# Patient Record
Sex: FEMALE | Race: WHITE | NOT HISPANIC OR LATINO | Employment: PART TIME
[De-identification: names, ages, dates, MRNs, and addresses within clinical notes are randomized per-mention and may not be internally consistent; named-entity substitution may affect disease eponyms.]

---

## 2017-01-05 ENCOUNTER — RECORDS - HEALTHEAST (OUTPATIENT)
Dept: ADMINISTRATIVE | Facility: OTHER | Age: 67
End: 2017-01-05

## 2017-03-23 ENCOUNTER — COMMUNICATION - HEALTHEAST (OUTPATIENT)
Dept: INTERNAL MEDICINE | Facility: CLINIC | Age: 67
End: 2017-03-23

## 2017-03-23 DIAGNOSIS — G47.00 INSOMNIA: ICD-10-CM

## 2017-04-06 ENCOUNTER — COMMUNICATION - HEALTHEAST (OUTPATIENT)
Dept: INTERNAL MEDICINE | Facility: CLINIC | Age: 67
End: 2017-04-06

## 2017-06-05 ENCOUNTER — RECORDS - HEALTHEAST (OUTPATIENT)
Dept: ADMINISTRATIVE | Facility: OTHER | Age: 67
End: 2017-06-05

## 2017-06-21 ENCOUNTER — OFFICE VISIT - HEALTHEAST (OUTPATIENT)
Dept: INTERNAL MEDICINE | Facility: CLINIC | Age: 67
End: 2017-06-21

## 2017-06-21 DIAGNOSIS — G47.00 INSOMNIA: ICD-10-CM

## 2017-06-21 DIAGNOSIS — H26.9 CATARACT: ICD-10-CM

## 2017-06-21 DIAGNOSIS — Z01.818 PREOP EXAMINATION: ICD-10-CM

## 2017-06-21 DIAGNOSIS — F90.9 ADHD (ATTENTION DEFICIT HYPERACTIVITY DISORDER): ICD-10-CM

## 2017-06-21 ASSESSMENT — MIFFLIN-ST. JEOR: SCORE: 1135.7

## 2017-06-22 ENCOUNTER — RECORDS - HEALTHEAST (OUTPATIENT)
Dept: ADMINISTRATIVE | Facility: OTHER | Age: 67
End: 2017-06-22

## 2017-06-22 ENCOUNTER — COMMUNICATION - HEALTHEAST (OUTPATIENT)
Dept: INTERNAL MEDICINE | Facility: CLINIC | Age: 67
End: 2017-06-22

## 2017-09-18 ENCOUNTER — COMMUNICATION - HEALTHEAST (OUTPATIENT)
Dept: SCHEDULING | Facility: CLINIC | Age: 67
End: 2017-09-18

## 2017-09-18 DIAGNOSIS — G47.00 INSOMNIA: ICD-10-CM

## 2017-10-06 ENCOUNTER — RECORDS - HEALTHEAST (OUTPATIENT)
Dept: ADMINISTRATIVE | Facility: OTHER | Age: 67
End: 2017-10-06

## 2017-11-30 ENCOUNTER — OFFICE VISIT - HEALTHEAST (OUTPATIENT)
Dept: INTERNAL MEDICINE | Facility: CLINIC | Age: 67
End: 2017-11-30

## 2017-11-30 DIAGNOSIS — E78.00 ELEVATED CHOLESTEROL: ICD-10-CM

## 2017-11-30 DIAGNOSIS — G47.00 INSOMNIA: ICD-10-CM

## 2017-11-30 DIAGNOSIS — F90.9 ADHD (ATTENTION DEFICIT HYPERACTIVITY DISORDER): ICD-10-CM

## 2017-11-30 LAB
CHOLEST SERPL-MCNC: 242 MG/DL
FASTING STATUS PATIENT QL REPORTED: YES
HDLC SERPL-MCNC: 74 MG/DL
LDLC SERPL CALC-MCNC: 158 MG/DL
TRIGL SERPL-MCNC: 51 MG/DL

## 2017-11-30 ASSESSMENT — MIFFLIN-ST. JEOR: SCORE: 1107.36

## 2017-12-19 ENCOUNTER — RECORDS - HEALTHEAST (OUTPATIENT)
Dept: ADMINISTRATIVE | Facility: OTHER | Age: 67
End: 2017-12-19

## 2017-12-20 ENCOUNTER — COMMUNICATION - HEALTHEAST (OUTPATIENT)
Dept: INTERNAL MEDICINE | Facility: CLINIC | Age: 67
End: 2017-12-20

## 2018-03-13 ENCOUNTER — COMMUNICATION - HEALTHEAST (OUTPATIENT)
Dept: INTERNAL MEDICINE | Facility: CLINIC | Age: 68
End: 2018-03-13

## 2018-03-13 DIAGNOSIS — G47.00 INSOMNIA: ICD-10-CM

## 2018-03-21 ENCOUNTER — COMMUNICATION - HEALTHEAST (OUTPATIENT)
Dept: INTERNAL MEDICINE | Facility: CLINIC | Age: 68
End: 2018-03-21

## 2018-03-21 DIAGNOSIS — F90.9 ADHD (ATTENTION DEFICIT HYPERACTIVITY DISORDER): ICD-10-CM

## 2018-03-28 ENCOUNTER — RECORDS - HEALTHEAST (OUTPATIENT)
Dept: ADMINISTRATIVE | Facility: OTHER | Age: 68
End: 2018-03-28

## 2018-05-22 ENCOUNTER — OFFICE VISIT - HEALTHEAST (OUTPATIENT)
Dept: INTERNAL MEDICINE | Facility: CLINIC | Age: 68
End: 2018-05-22

## 2018-05-22 DIAGNOSIS — N64.4 BREAST PAIN, RIGHT: ICD-10-CM

## 2018-05-29 ENCOUNTER — HOSPITAL ENCOUNTER (OUTPATIENT)
Dept: ULTRASOUND IMAGING | Facility: CLINIC | Age: 68
Discharge: HOME | End: 2018-05-29
Attending: INTERNAL MEDICINE

## 2018-05-29 ENCOUNTER — HOSPITAL ENCOUNTER (OUTPATIENT)
Dept: MAMMOGRAPHY | Facility: CLINIC | Age: 68
Discharge: HOME | End: 2018-05-29
Attending: INTERNAL MEDICINE

## 2018-05-29 DIAGNOSIS — N64.4 BREAST PAIN, RIGHT: ICD-10-CM

## 2018-06-06 ENCOUNTER — RECORDS - HEALTHEAST (OUTPATIENT)
Dept: ADMINISTRATIVE | Facility: OTHER | Age: 68
End: 2018-06-06

## 2018-06-14 ENCOUNTER — COMMUNICATION - HEALTHEAST (OUTPATIENT)
Dept: INTERNAL MEDICINE | Facility: CLINIC | Age: 68
End: 2018-06-14

## 2018-06-14 ENCOUNTER — OFFICE VISIT - HEALTHEAST (OUTPATIENT)
Dept: INTERNAL MEDICINE | Facility: CLINIC | Age: 68
End: 2018-06-14

## 2018-06-14 ENCOUNTER — AMBULATORY - HEALTHEAST (OUTPATIENT)
Dept: INTERNAL MEDICINE | Facility: CLINIC | Age: 68
End: 2018-06-14

## 2018-06-14 DIAGNOSIS — G47.00 INSOMNIA: ICD-10-CM

## 2018-06-14 DIAGNOSIS — F90.9 ADHD (ATTENTION DEFICIT HYPERACTIVITY DISORDER): ICD-10-CM

## 2018-06-14 DIAGNOSIS — E78.2 MIXED HYPERLIPIDEMIA: ICD-10-CM

## 2018-06-14 DIAGNOSIS — E55.9 VITAMIN D DEFICIENCY: ICD-10-CM

## 2018-06-14 LAB
ALBUMIN SERPL-MCNC: 3.9 G/DL (ref 3.5–5)
ALP SERPL-CCNC: 67 U/L (ref 45–120)
ALT SERPL W P-5'-P-CCNC: 26 U/L (ref 0–45)
ANION GAP SERPL CALCULATED.3IONS-SCNC: 14 MMOL/L (ref 5–18)
AST SERPL W P-5'-P-CCNC: 23 U/L (ref 0–40)
BILIRUB SERPL-MCNC: 1 MG/DL (ref 0–1)
BUN SERPL-MCNC: 21 MG/DL (ref 8–22)
CALCIUM SERPL-MCNC: 9.5 MG/DL (ref 8.5–10.5)
CHLORIDE BLD-SCNC: 104 MMOL/L (ref 98–107)
CHOLEST SERPL-MCNC: 214 MG/DL
CO2 SERPL-SCNC: 23 MMOL/L (ref 22–31)
CREAT SERPL-MCNC: 0.83 MG/DL (ref 0.6–1.1)
FASTING STATUS PATIENT QL REPORTED: YES
GFR SERPL CREATININE-BSD FRML MDRD: >60 ML/MIN/1.73M2
GLUCOSE BLD-MCNC: 81 MG/DL (ref 70–125)
HDLC SERPL-MCNC: 74 MG/DL
LDLC SERPL CALC-MCNC: 129 MG/DL
POTASSIUM BLD-SCNC: 4.7 MMOL/L (ref 3.5–5)
PROT SERPL-MCNC: 6.5 G/DL (ref 6–8)
SODIUM SERPL-SCNC: 141 MMOL/L (ref 136–145)
TRIGL SERPL-MCNC: 57 MG/DL
TSH SERPL DL<=0.005 MIU/L-ACNC: 2.22 UIU/ML (ref 0.3–5)

## 2018-06-15 LAB — 25(OH)D3 SERPL-MCNC: 24.2 NG/ML (ref 30–80)

## 2018-06-25 ENCOUNTER — COMMUNICATION - HEALTHEAST (OUTPATIENT)
Dept: INTERNAL MEDICINE | Facility: CLINIC | Age: 68
End: 2018-06-25

## 2018-07-16 ENCOUNTER — RECORDS - HEALTHEAST (OUTPATIENT)
Dept: ADMINISTRATIVE | Facility: OTHER | Age: 68
End: 2018-07-16

## 2018-08-23 ENCOUNTER — OFFICE VISIT - HEALTHEAST (OUTPATIENT)
Dept: INTERNAL MEDICINE | Facility: CLINIC | Age: 68
End: 2018-08-23

## 2018-08-23 DIAGNOSIS — J47.9 BRONCHIECTASIS WITHOUT COMPLICATION (H): ICD-10-CM

## 2018-08-23 DIAGNOSIS — F90.9 ADHD (ATTENTION DEFICIT HYPERACTIVITY DISORDER): ICD-10-CM

## 2018-08-23 DIAGNOSIS — Z51.81 MEDICATION MONITORING ENCOUNTER: ICD-10-CM

## 2018-08-23 DIAGNOSIS — E78.2 MIXED HYPERLIPIDEMIA: ICD-10-CM

## 2018-08-23 DIAGNOSIS — Z78.0 MENOPAUSE: ICD-10-CM

## 2018-08-23 DIAGNOSIS — Z12.11 SCREEN FOR COLON CANCER: ICD-10-CM

## 2018-08-23 DIAGNOSIS — Z00.00 ROUTINE GENERAL MEDICAL EXAMINATION AT A HEALTH CARE FACILITY: ICD-10-CM

## 2018-08-23 DIAGNOSIS — G47.00 INSOMNIA: ICD-10-CM

## 2018-08-23 LAB
AMPHETAMINES UR QL SCN: NORMAL
BARBITURATES UR QL: NORMAL
BENZODIAZ UR QL: NORMAL
CANNABINOIDS UR QL SCN: NORMAL
COCAINE UR QL: NORMAL
CREAT UR-MCNC: 60.6 MG/DL
METHADONE UR QL SCN: NORMAL
OPIATES UR QL SCN: NORMAL
OXYCODONE UR QL: NORMAL
PCP UR QL SCN: NORMAL

## 2018-08-23 ASSESSMENT — MIFFLIN-ST. JEOR: SCORE: 1100.28

## 2018-09-10 ENCOUNTER — COMMUNICATION - HEALTHEAST (OUTPATIENT)
Dept: INTERNAL MEDICINE | Facility: CLINIC | Age: 68
End: 2018-09-10

## 2018-09-11 ENCOUNTER — RECORDS - HEALTHEAST (OUTPATIENT)
Dept: ADMINISTRATIVE | Facility: OTHER | Age: 68
End: 2018-09-11

## 2018-09-24 ENCOUNTER — COMMUNICATION - HEALTHEAST (OUTPATIENT)
Dept: INTERNAL MEDICINE | Facility: CLINIC | Age: 68
End: 2018-09-24

## 2018-09-24 DIAGNOSIS — F90.9 ADHD (ATTENTION DEFICIT HYPERACTIVITY DISORDER): ICD-10-CM

## 2018-12-03 ENCOUNTER — COMMUNICATION - HEALTHEAST (OUTPATIENT)
Dept: INTERNAL MEDICINE | Facility: CLINIC | Age: 68
End: 2018-12-03

## 2018-12-03 DIAGNOSIS — G47.00 INSOMNIA: ICD-10-CM

## 2018-12-14 ENCOUNTER — RECORDS - HEALTHEAST (OUTPATIENT)
Dept: ADMINISTRATIVE | Facility: OTHER | Age: 68
End: 2018-12-14

## 2019-01-04 ENCOUNTER — COMMUNICATION - HEALTHEAST (OUTPATIENT)
Dept: INTERNAL MEDICINE | Facility: CLINIC | Age: 69
End: 2019-01-04

## 2019-02-26 ENCOUNTER — COMMUNICATION - HEALTHEAST (OUTPATIENT)
Dept: INTERNAL MEDICINE | Facility: CLINIC | Age: 69
End: 2019-02-26

## 2019-02-26 DIAGNOSIS — G47.00 INSOMNIA: ICD-10-CM

## 2019-02-26 DIAGNOSIS — F90.9 ADHD (ATTENTION DEFICIT HYPERACTIVITY DISORDER): ICD-10-CM

## 2019-02-28 RX ORDER — ZOLPIDEM TARTRATE 10 MG/1
TABLET ORAL
Qty: 90 TABLET | Refills: 0 | Status: SHIPPED | OUTPATIENT
Start: 2019-02-28

## 2019-03-12 ENCOUNTER — COMMUNICATION - HEALTHEAST (OUTPATIENT)
Dept: INTERNAL MEDICINE | Facility: CLINIC | Age: 69
End: 2019-03-12

## 2019-03-12 DIAGNOSIS — F90.9 ADHD (ATTENTION DEFICIT HYPERACTIVITY DISORDER): ICD-10-CM

## 2019-03-12 RX ORDER — DEXTROAMPHETAMINE SULFATE 10 MG/1
10 TABLET ORAL DAILY
Qty: 30 TABLET | Refills: 0 | Status: SHIPPED | OUTPATIENT
Start: 2019-03-12

## 2019-04-22 ENCOUNTER — RECORDS - HEALTHEAST (OUTPATIENT)
Dept: LAB | Facility: CLINIC | Age: 69
End: 2019-04-22

## 2019-04-24 ENCOUNTER — RECORDS - HEALTHEAST (OUTPATIENT)
Dept: LAB | Facility: CLINIC | Age: 69
End: 2019-04-24

## 2019-04-24 LAB
ALBUMIN SERPL-MCNC: 4 G/DL (ref 3.5–5)
ALP SERPL-CCNC: 70 U/L (ref 45–120)
ALT SERPL W P-5'-P-CCNC: 36 U/L (ref 0–45)
ANION GAP SERPL CALCULATED.3IONS-SCNC: 9 MMOL/L (ref 5–18)
AST SERPL W P-5'-P-CCNC: 28 U/L (ref 0–40)
BILIRUB SERPL-MCNC: 0.5 MG/DL (ref 0–1)
BUN SERPL-MCNC: 19 MG/DL (ref 8–28)
CALCIUM SERPL-MCNC: 9.4 MG/DL (ref 8.5–10.5)
CHLORIDE BLD-SCNC: 107 MMOL/L (ref 98–107)
CO2 SERPL-SCNC: 27 MMOL/L (ref 22–31)
CREAT SERPL-MCNC: 0.78 MG/DL (ref 0.6–1.1)
ERYTHROCYTE [DISTWIDTH] IN BLOOD BY AUTOMATED COUNT: 12.3 % (ref 11–14.5)
GFR SERPL CREATININE-BSD FRML MDRD: >60 ML/MIN/1.73M2
GLUCOSE BLD-MCNC: 96 MG/DL (ref 70–125)
HCT VFR BLD AUTO: 41.6 % (ref 35–47)
HGB BLD-MCNC: 14.1 G/DL (ref 12–16)
IRON SATN MFR SERPL: 33 % (ref 20–50)
IRON SERPL-MCNC: 101 UG/DL (ref 42–175)
MCH RBC QN AUTO: 30.3 PG (ref 27–34)
MCHC RBC AUTO-ENTMCNC: 33.9 G/DL (ref 32–36)
MCV RBC AUTO: 90 FL (ref 80–100)
PLATELET # BLD AUTO: 269 THOU/UL (ref 140–440)
PMV BLD AUTO: 9.6 FL (ref 8.5–12.5)
POTASSIUM BLD-SCNC: 3.9 MMOL/L (ref 3.5–5)
PROT SERPL-MCNC: 6.6 G/DL (ref 6–8)
RBC # BLD AUTO: 4.65 MILL/UL (ref 3.8–5.4)
SODIUM SERPL-SCNC: 143 MMOL/L (ref 136–145)
T3FREE SERPL-MCNC: 2.7 PG/ML (ref 1.9–3.9)
T4 FREE SERPL-MCNC: 1 NG/DL (ref 0.7–1.8)
TIBC SERPL-MCNC: 302 UG/DL (ref 313–563)
TRANSFERRIN SERPL-MCNC: 241 MG/DL (ref 212–360)
TSH SERPL DL<=0.005 MIU/L-ACNC: 2.26 UIU/ML (ref 0.3–5)
WBC: 5.7 THOU/UL (ref 4–11)

## 2019-04-25 ENCOUNTER — RECORDS - HEALTHEAST (OUTPATIENT)
Dept: LAB | Facility: CLINIC | Age: 69
End: 2019-04-25

## 2019-04-25 LAB
FASTING STATUS PATIENT QL REPORTED: NORMAL
FERRITIN SERPL-MCNC: 183 NG/ML (ref 10–130)
HOMOCYSTEINE PLASMA - HISTORICAL: 9 UMOL/L (ref 0–13)
VIT B12 SERPL-MCNC: 405 PG/ML (ref 213–816)

## 2019-04-26 LAB — 25(OH)D3 SERPL-MCNC: 60 NG/ML (ref 30–80)

## 2020-05-15 ENCOUNTER — RECORDS - HEALTHEAST (OUTPATIENT)
Dept: LAB | Facility: CLINIC | Age: 70
End: 2020-05-15

## 2020-05-15 LAB
ALBUMIN SERPL-MCNC: 4.1 G/DL (ref 3.5–5)
ALP SERPL-CCNC: 74 U/L (ref 45–120)
ALT SERPL W P-5'-P-CCNC: 24 U/L (ref 0–45)
ANION GAP SERPL CALCULATED.3IONS-SCNC: 14 MMOL/L (ref 5–18)
AST SERPL W P-5'-P-CCNC: 24 U/L (ref 0–40)
BILIRUB SERPL-MCNC: 0.5 MG/DL (ref 0–1)
BUN SERPL-MCNC: 18 MG/DL (ref 8–28)
CALCIUM SERPL-MCNC: 9.5 MG/DL (ref 8.5–10.5)
CHLORIDE BLD-SCNC: 100 MMOL/L (ref 98–107)
CO2 SERPL-SCNC: 24 MMOL/L (ref 22–31)
CREAT SERPL-MCNC: 0.76 MG/DL (ref 0.6–1.1)
ERYTHROCYTE [DISTWIDTH] IN BLOOD BY AUTOMATED COUNT: 12.2 % (ref 11–14.5)
FOLATE SERPL-MCNC: 15 NG/ML
GFR SERPL CREATININE-BSD FRML MDRD: >60 ML/MIN/1.73M2
GLUCOSE BLD-MCNC: 93 MG/DL (ref 70–125)
HCT VFR BLD AUTO: 42.8 % (ref 35–47)
HGB BLD-MCNC: 14 G/DL (ref 12–16)
MCH RBC QN AUTO: 29.9 PG (ref 27–34)
MCHC RBC AUTO-ENTMCNC: 32.7 G/DL (ref 32–36)
MCV RBC AUTO: 92 FL (ref 80–100)
PLATELET # BLD AUTO: 237 THOU/UL (ref 140–440)
PMV BLD AUTO: 9.9 FL (ref 8.5–12.5)
POTASSIUM BLD-SCNC: 4.2 MMOL/L (ref 3.5–5)
PROT SERPL-MCNC: 6.7 G/DL (ref 6–8)
RBC # BLD AUTO: 4.68 MILL/UL (ref 3.8–5.4)
SODIUM SERPL-SCNC: 138 MMOL/L (ref 136–145)
T4 FREE SERPL-MCNC: 0.9 NG/DL (ref 0.7–1.8)
TSH SERPL DL<=0.005 MIU/L-ACNC: 1.96 UIU/ML (ref 0.3–5)
VIT B12 SERPL-MCNC: 298 PG/ML (ref 213–816)
WBC: 8.3 THOU/UL (ref 4–11)

## 2020-06-25 ENCOUNTER — RECORDS - HEALTHEAST (OUTPATIENT)
Dept: ADMINISTRATIVE | Facility: OTHER | Age: 70
End: 2020-06-25

## 2020-09-03 ENCOUNTER — COMMUNICATION - HEALTHEAST (OUTPATIENT)
Dept: PULMONOLOGY | Facility: OTHER | Age: 70
End: 2020-09-03

## 2020-09-04 ENCOUNTER — AMBULATORY - HEALTHEAST (OUTPATIENT)
Dept: PULMONOLOGY | Facility: OTHER | Age: 70
End: 2020-09-04

## 2020-09-04 DIAGNOSIS — J47.9 BRONCHIECTASIS (H): ICD-10-CM

## 2020-09-28 ENCOUNTER — OFFICE VISIT - HEALTHEAST (OUTPATIENT)
Dept: PULMONOLOGY | Facility: OTHER | Age: 70
End: 2020-09-28

## 2020-09-28 ENCOUNTER — RECORDS - HEALTHEAST (OUTPATIENT)
Dept: PULMONOLOGY | Facility: OTHER | Age: 70
End: 2020-09-28

## 2020-09-28 ENCOUNTER — RECORDS - HEALTHEAST (OUTPATIENT)
Dept: ADMINISTRATIVE | Facility: OTHER | Age: 70
End: 2020-09-28

## 2020-09-28 DIAGNOSIS — J47.9 BRONCHIECTASIS, UNCOMPLICATED (H): ICD-10-CM

## 2020-09-28 DIAGNOSIS — J47.9 BRONCHIECTASIS WITHOUT COMPLICATION (H): ICD-10-CM

## 2020-09-28 LAB — HGB BLD-MCNC: 12.7 G/DL

## 2020-09-28 RX ORDER — AZELASTINE 1 MG/ML
2 SPRAY, METERED NASAL 2 TIMES DAILY
Status: SHIPPED | COMMUNITY
Start: 2020-08-27

## 2020-09-28 RX ORDER — BUSPIRONE HYDROCHLORIDE 5 MG/1
5 TABLET ORAL DAILY
Status: SHIPPED | COMMUNITY
Start: 2020-09-11

## 2020-09-28 ASSESSMENT — MIFFLIN-ST. JEOR: SCORE: 1107.36

## 2021-05-25 ENCOUNTER — RECORDS - HEALTHEAST (OUTPATIENT)
Dept: ADMINISTRATIVE | Facility: CLINIC | Age: 71
End: 2021-05-25

## 2021-05-26 ENCOUNTER — RECORDS - HEALTHEAST (OUTPATIENT)
Dept: ADMINISTRATIVE | Facility: CLINIC | Age: 71
End: 2021-05-26

## 2021-05-27 ENCOUNTER — RECORDS - HEALTHEAST (OUTPATIENT)
Dept: ADMINISTRATIVE | Facility: CLINIC | Age: 71
End: 2021-05-27

## 2021-05-28 ENCOUNTER — RECORDS - HEALTHEAST (OUTPATIENT)
Dept: ADMINISTRATIVE | Facility: CLINIC | Age: 71
End: 2021-05-28

## 2021-05-29 ENCOUNTER — RECORDS - HEALTHEAST (OUTPATIENT)
Dept: ADMINISTRATIVE | Facility: CLINIC | Age: 71
End: 2021-05-29

## 2021-05-31 VITALS — HEIGHT: 64 IN | BODY MASS INDEX: 24.41 KG/M2 | WEIGHT: 143 LBS

## 2021-05-31 VITALS — BODY MASS INDEX: 23.05 KG/M2 | HEIGHT: 64 IN | WEIGHT: 135 LBS

## 2021-06-01 VITALS — WEIGHT: 134.9 LBS | BODY MASS INDEX: 23.03 KG/M2 | HEIGHT: 64 IN

## 2021-06-01 VITALS — WEIGHT: 138 LBS | BODY MASS INDEX: 23.69 KG/M2

## 2021-06-05 VITALS
OXYGEN SATURATION: 97 % | DIASTOLIC BLOOD PRESSURE: 64 MMHG | WEIGHT: 135 LBS | HEART RATE: 58 BPM | SYSTOLIC BLOOD PRESSURE: 138 MMHG | HEIGHT: 64 IN | BODY MASS INDEX: 23.05 KG/M2

## 2021-06-11 NOTE — PATIENT INSTRUCTIONS - HE
1) Your lung function is completely normal on exam on the pulmonary function test  2) As you know the bronchiectasis is a lifetime scarring of your lungs, you can do right by it with a little exercise/activity and using the aerobika device to help clear out any stagnant mucous  3) I would have a lower threshold to treat a cough or fever in your chest with antibiotics because you are technically more susceptible to infections in your lungs  4) If you have any problems, feel free to contact us

## 2021-06-11 NOTE — PROGRESS NOTES
Assessment/Plan:      Visit for Preoperative Exam.  YAG Surgery Capsulotomy in right eye    Patient approved for surgery with general or local anesthesia.     Subjective:     Scheduled Procedure: YAG Surgery Capsulotomy in right eye  Surgery Date:  July 17/2017  Surgery Location:  [facility] [place] - Fax [phone]  Surgeon: Dr. Bowers    ASSESSED PROBLEMS:  Problem List Items Addressed This Visit     None      Visit Diagnoses     Insomnia        Relevant Medications    zolpidem (AMBIEN) 10 mg tablet    ADHD (attention deficit hyperactivity disorder)        Relevant Medications    zolpidem (AMBIEN) 10 mg tablet    dextroamphetamine (DEXEDRINE) 10 MG tablet          CHIEF COMPLAINT:  Chief Complaint   Patient presents with     Pre-op Exam     YAG LASER CAPSULOTOMY        HISTORY OF PRESENT ILLNESS:  Erica Olson is a 66 y.o. female here for an internal medicine pre-operative consultation. The exam is requested by   in preparation for YAG Surgery Capsulotomy in right eye to be performed at [facility]  on 7/17/17.     She has already had cataracts surgery. In the past year she developed a haziness behind her left eye. The haziness is present bilaterally, but her left eye has always caused problems therefore the priority is to maintain her right eye. She is feeling very well overall.     Erica Olson has tolerated previous surgeries well without bleeding or anesthesia difficulty.   .............................................................................................................................................  Current Outpatient Prescriptions   Medication Sig Dispense Refill     ascorbic acid (VITAMIN C) 1000 MG tablet Take 1,000 mg by mouth daily.       calcium & magnesium carbonates (MYLANTA) 311-232 mg per tablet Take 1 tablet by mouth daily.       dextroamphetamine (DEXEDRINE) 10 MG tablet Take 1 tablet (10 mg total) by mouth daily. 90 tablet 0     fluticasone (FLONASE)  50 mcg/actuation nasal spray 1 spray into each nostril 2 times a day at 6:00 am and 4:00 pm. 18 g 11     glucosamine-chondroitin 500-400 mg tablet Take 1 tablet by mouth 3 (three) times a day.       magnesium 30 mg tablet Take 30 mg by mouth 2 (two) times a day.       zolpidem (AMBIEN) 10 mg tablet Take 1/2-1 tablet at bedtime as needed. 90 tablet 0     co-enzyme Q-10 30 mg capsule Take 30 mg by mouth 3 (three) times a day.       OMEGA-3/DHA/EPA/FISH OIL (FISH OIL-OMEGA-3 FATTY ACIDS) 300-1,000 mg capsule Take 2 g by mouth daily.       tretinoin (RETIN-A) 0.05 % cream   11     No current facility-administered medications for this visit.        No Known Allergies    Immunization History   Administered Date(s) Administered     DT (pediatric) 01/01/2000     Pneumo Conj 13-V (2010&after) 11/19/2014     Pneumo Polysac 23-V 11/22/2008, 10/29/2015     Td, historic 08/27/2010     Tdap 08/27/2010       Patient Active Problem List   Diagnosis     Hyperlipidemia     Osteopenia     Postsurgical Acquired Absence Of Genitalia     Dysthymic Disorder     Insomnia     Obstructive Sleep Apnea     Bronchiectasis       No past medical history on file.    Social History     Social History     Marital status:      Spouse name: N/A     Number of children: N/A     Years of education: N/A     Occupational History     Not on file.     Social History Main Topics     Smoking status: Former Smoker     Smokeless tobacco: Not on file     Alcohol use Not on file     Drug use: Not on file     Sexual activity: Not on file     Other Topics Concern     Not on file     Social History Narrative       Past Surgical History:   Procedure Laterality Date     CA ARTHROPLASTY TIBIAL PLATEAU      Description: Knee Replacement;  Proc Date: 05/01/2010;  Comments: Partial     CA ARTHROPLASTY TIBIAL PLATEAU      Description: Knee Replacement;  Proc Date: 01/01/2007;  Comments: Partial     CA ENLARGE BREAST      Description: Breast Surgery Enlargement  Procedure;  Recorded: 07/02/2009;     KY LIGATE FALLOPIAN TUBE      Description: Tubal Ligation;  Recorded: 07/24/2009;         History of Present Illness  Recent Health  Fever: no  Chills: no  Fatigue: no  Chest Pain: no  Cough: no  Dyspnea: no  Urinary Frequency: no  Nausea: no  Vomiting: no  Diarrhea: no  Abdominal Pain: no  Easy Bruising: yes - normal   Lower Extremity Swelling: no  Poor Exercise Tolerance: no      Pertinent History  Prior Anesthesia: yes  Previous Anesthesia Reaction:  no  Diabetes: no  Cardiovascular Disease: no  Pulmonary Disease: no  Renal Disease: no  GI Disease: no  Sleep Apnea: yes  Thromboembolic Problems: no  Clotting Disorder: no  Bleeding Disorder: no  Transfusion Reaction: no  Impaired Immunity: no  Steroid use in the last 6 months: no  Frequent Aspirin use: yes PRN    Family history of Stroke and Mother  had stroke not with surgey    Social history of patient does not wear denture or partial plates    After surgery, the patient plans to recover at home with family.    Review of Systems  None of her teeth are causing her discomfort. She has been healthy recently. She does have a history of sinus problems. A 12 point comprehensive review of systems was negative except as noted.      Objective:      Vitals:    06/21/17 1423   BP: 110/58   Pulse: 66   Resp: 12   SpO2: 97%        Physical Exam:  General Appearance: Alert, cooperative, no distress, appears stated age   Head: Normocephalic, without obvious abnormality, atraumatic   Eyes: PERRL, conjunctiva/corneas clear, EOM's intact   Throat: Lips, mucosa, and tongue normal; teeth and gums normal   Neck: Normal ROM, no carotid bruits   Lungs: Clear to auscultation bilaterally, respirations unlabored.   Heart: Regular rate and rhythm, S1 and S2 normal, no murmur, rub, or gallop,   Abdomen: Soft, non-tender, bowel sounds active all four quadrants, no masses, no organomegaly   Extremities: Extremities normal, atraumatic, no cyanosis or edema    Skin: Skin color, texture, turgor normal, no rashes or lesions   Neurologic: Normal     ADDITIONAL HISTORY SUMMARIZED (2): None.  DECISION TO OBTAIN EXTRA INFORMATION (1): None.   RADIOLOGY TESTS (1): None.  LABS (1): None.  MEDICINE TESTS (1): None.  INDEPENDENT REVIEW (2 each): None.     The visit lasted a total of 10 minutes face to face with the patient. Over 50% of the time was spent counseling and educating the patient about YAG Surgery Capsulotomy in right eye.    IDayana, am scribing for and in the presence of, Shakila Coleman MD.    IShakila MD, personally performed the services described in this documentation, as scribed by Dayana Romano in my presence, and it is both accurate and complete.    Total Points: 0

## 2021-06-11 NOTE — PROGRESS NOTES
Assessment and Plan:Erica Olson is a 70 y.o. F with a past medical history significant for recurrent sinusitis and bronchiectasis who presents to clinic today for follow up.  She has a history of bronchiectasis and has struggled with a bronchopulmonary episode lasting months in the past.  She recently struggled with her sinusitis again and wanted to make sure her lung health was preserved.  Her spirometry and DLCO are completely normal.  Her lungs sound clear, and her symptoms are reassuring.  We discussed how exercise and activity are excellent ways to preserve lung health with bronchiectasis.  She does not require any medication for maintenance.  I provided a new aerobika today to use to augment her airway clearance if coughing which should help prevent lower airways infections, especially if struggling with sinusitis..   1. Bronchiectasis - mild with no evidence of obstruction on PFTs.  Routine mainteance with airway clearance using exercise, or aerobika.  If she develops a cough with chest congestion, would have a low threshold to treat with antibiotics given a propensity to develop bronchopneumonia.  2. RTC PRN      CCx: bronchiectasis checkup    HPI: Ms. Olson is a 70 year old female, last seen in this clinic over 5 years ago, so this is a new visit for us.  She is being seen for bronchiectasis follow up.  On her last visit she was struggling with sinusitis that went to her chest and resulted in mutiple rounds of antibiotics witih vigorous coughing before it finally shook loose.  This spring and summer she had another challenge with sinusitis acting up, and this required multiple rounds of antibiotics again.  Her lungs seemed to do better this time, and for the last four weeks she feels her cough has been good.  She does not think it necessarily went into her lungs this time, but wants to be sure her lungs are OK.  She is not short of breath at a baseline and has no wheezing.  Her cough is not usually  productive, but when she had it, it felt like it comes from her chest and not her throat.  She has not had fevers.  She was a distant smoker.  She notes she had been seen at [facility] where CTs revealed a 4mm nodule that on follow up hadn't changed.  She is not a vigorous exercise, but stays active.    PMH:  No past medical history on file.    PSH:  Past Surgical History:   Procedure Laterality Date     AUGMENTATION MAMMAPLASTY       BREAST CYST ASPIRATION Right      BREAST CYST EXCISION Right      HYSTERECTOMY Bilateral 1998     OOPHORECTOMY       CO ARTHROPLASTY TIBIAL PLATEAU      Description: Knee Replacement;  Proc Date: 05/01/2010;  Comments: Partial     CO ARTHROPLASTY TIBIAL PLATEAU      Description: Knee Replacement;  Proc Date: 01/01/2007;  Comments: Partial     CO ENLARGE BREAST      Description: Breast Surgery Enlargement Procedure;  Recorded: 07/02/2009;     CO LIGATE FALLOPIAN TUBE      Description: Tubal Ligation;  Recorded: 07/24/2009;       SH:  Social History     Socioeconomic History     Marital status:      Spouse name: Not on file     Number of children: Not on file     Years of education: Not on file     Highest education level: Not on file   Occupational History     Not on file   Social Needs     Financial resource strain: Not on file     Food insecurity     Worry: Not on file     Inability: Not on file     Transportation needs     Medical: Not on file     Non-medical: Not on file   Tobacco Use     Smoking status: Former Smoker     Smokeless tobacco: Never Used   Substance and Sexual Activity     Alcohol use: Not on file     Drug use: Not on file     Sexual activity: Not on file   Lifestyle     Physical activity     Days per week: Not on file     Minutes per session: Not on file     Stress: Not on file   Relationships     Social connections     Talks on phone: Not on file     Gets together: Not on file     Attends Voodoo service: Not on file     Active member of club or organization: Not  on file     Attends meetings of clubs or organizations: Not on file     Relationship status: Not on file     Intimate partner violence     Fear of current or ex partner: Not on file     Emotionally abused: Not on file     Physically abused: Not on file     Forced sexual activity: Not on file   Other Topics Concern     Not on file   Social History Narrative     Not on file       Family history:  No family history on file.  The family history was reviewed and is not pertinent to the chief complaint or HPI.    ROS:  Review of Systems - History obtained from the patient  General ROS: negative  Psychological ROS: negative  ENT ROS: negative  Allergy and Immunology ROS: negative  Endocrine ROS: negative  Respiratory ROS: positive for - cough  negative for - sputum changes, stridor, tachypnea or wheezing  Cardiovascular ROS: no chest pain or palpitations  Gastrointestinal ROS: no abdominal pain, change in bowel habits, or black or bloody stools  Genito-Urinary ROS: no dysuria, trouble voiding, or hematuria  Musculoskeletal ROS: negative  Neurological ROS: no TIA or stroke symptoms  Dermatological ROS: negative      Current Meds:  Current Outpatient Medications   Medication Sig     ascorbic acid (VITAMIN C) 1000 MG tablet Take 1,000 mg by mouth daily.     azelastine (ASTELIN) 137 mcg (0.1 %) nasal spray 2 sprays 2 (two) times a day.     busPIRone (BUSPAR) 5 MG tablet Take 5 mg by mouth daily.     calcium & magnesium carbonates (MYLANTA) 311-232 mg per tablet Take 1 tablet by mouth daily.     co-enzyme Q-10 30 mg capsule Take 30 mg by mouth 3 (three) times a day.     dextroamphetamine (DEXEDRINE) 10 MG tablet Take 1 tablet (10 mg total) by mouth daily.     FLUoxetine (PROZAC) 20 MG capsule Take 20 mg by mouth daily.     fluticasone (FLONASE) 50 mcg/actuation nasal spray 1 spray into each nostril 2 times a day at 6:00 am and 4:00 pm.     glucosamine-chondroitin 500-400 mg tablet Take 1 tablet by mouth 3 (three) times a day.      magnesium 30 mg tablet Take 30 mg by mouth 2 (two) times a day.     OMEGA-3/DHA/EPA/FISH OIL (FISH OIL-OMEGA-3 FATTY ACIDS) 300-1,000 mg capsule Take 2 g by mouth daily.     zolpidem (AMBIEN) 10 mg tablet Take 1/2-1 tablets at bedtime as  needed       Labs:  Recent Results (from the past 72 hour(s))   POCT hemoglobin   Result Value Ref Range    Hgb 12.7 7.0 g/dL       I have personally reviewed all imaging and PFT data available pertinent to this visit.    Imaging studies:  Mammo Diagnostic Bilateral    Result Date: 5/29/2018  MAMMO DIAGNOSTIC 2D BILATERAL, US BREAST LIMITED (FOCAL) RIGHT 5/29/2018 1:30 PM INDICATION: Right upper outer quadrant breast pain. COMPARISON: 12/14/2011 and older studies. MAMMOGRAPHIC FINDINGS: Bilateral full-field digital diagnostic mammograms performed. The breasts are heterogeneously dense, which may obscure small masses. Implants present bilaterally. Scattered overlying glandular tissue. A few benign calcifications. No suspicious lesions. Images evaluated with the assistance of CAD. Scans performed in the area of pain identified by the patient as being within the entire right upper outer quadrant. Scans through this region demonstrate normal glandular tissue overlying the implant. No suspicious abnormality evident.     No sonographic or mammographic evidence for malignancy. Further workup should be based on suspicion of clinical exam. ACR BI-RADS Category 2: Benign. Results given to the patient who should resume routine screening mammography.    Us Breast Limited (focal) Right    Result Date: 5/29/2018  MAMMO DIAGNOSTIC 2D BILATERAL, US BREAST LIMITED (FOCAL) RIGHT 5/29/2018 1:30 PM INDICATION: Right upper outer quadrant breast pain. COMPARISON: 12/14/2011 and older studies. MAMMOGRAPHIC FINDINGS: Bilateral full-field digital diagnostic mammograms performed. The breasts are heterogeneously dense, which may obscure small masses. Implants present bilaterally. Scattered overlying  "glandular tissue. A few benign calcifications. No suspicious lesions. Images evaluated with the assistance of CAD. Scans performed in the area of pain identified by the patient as being within the entire right upper outer quadrant. Scans through this region demonstrate normal glandular tissue overlying the implant. No suspicious abnormality evident.     No sonographic or mammographic evidence for malignancy. Further workup should be based on suspicion of clinical exam. ACR BI-RADS Category 2: Benign. Results given to the patient who should resume routine screening mammography.      PFTs:  FEV1/FVC is 76% and is normal.  FEV1 is 2.19L (103%) predicted and is normal.  FVC is 2.89L (105%) predicted and normal.  There was no improvement in spirometry after a single inhaled dose of bronchodilator.    DLCO is 24.42ml/min/hg (128%) predicted and is increased when it is corrected for hemoglobin.  Flow volume loops indicate .    Impression:  Spirometry and DLCO are normal  Toby Nesbitt          Physical Exam:  /64   Pulse (!) 58   Ht 5' 4\" (1.626 m)   Wt 135 lb (61.2 kg)   SpO2 97% Comment: RA  BMI 23.17 kg/m    General - Well nourished  Ears/Mouth - deferred given mask use  Lungs - Clear to ausculation bilaterally   CVS - regular rhythm with no murmurs, rubs or gallups  Ext - no cyanosis, clubbing or edema  Skin - no rash  Psychology - alert and oriented, answers appropriate        Electronically signed by:    Toby Nesbitt MD PhD  [facility]  "

## 2021-06-14 NOTE — PROGRESS NOTES
ASSESSMENT and PLAN:  1. ADHD (attention deficit hyperactivity disorder)  Stable on meds.  Refills provided.  CSA updated today and she was given a blank copy of the CSA.   website isn't working for me, but we can check this at a later time.  - dextroamphetamine (DEXEDRINE) 10 MG tablet; Take 1 tablet (10 mg total) by mouth daily.  Dispense: 90 tablet; Refill: 0    2. Insomnia  Stable on zolpidem.  CSA updated today.  She's going to try to replace zolpidem w/ trazodone and a new rx was provided.  - zolpidem (AMBIEN) 10 mg tablet; Take 1/2-1 tablet at bedtime as needed.  Dispense: 90 tablet; Refill: 0  - traZODone (DESYREL) 100 MG tablet; Take 1 tablet (100 mg total) by mouth at bedtime.  Dispense: 90 tablet; Refill: 3    3. Elevated cholesterol  She's fasting today.  We'll get labs.  - Lipid Cascade  - Comprehensive Metabolic Panel  - Thyroid Cascade        Medications Discontinued During This Encounter   Medication Reason     dextroamphetamine (DEXEDRINE) 10 MG tablet Reorder     zolpidem (AMBIEN) 10 mg tablet Reorder       No Follow-up on file.    There are no Patient Instructions on file for this visit.    CHIEF COMPLAINT:  Chief Complaint   Patient presents with     Medication Refill     MED CHECK/Zolpidem and Ambien AND FASTING Labs please, NO TIME TO HAVE P.E. DONE TODAY       HISTORY OF PRESENT ILLNESS:  Erica Olson is a 67 y.o. female  presenting to the clinic today for medication refills.  She has an appt she needs to get to and would like to do her PE later.  She's fasting and would like to get labs today.    She feels well.  She is seeing Dr. Vincent and anticipates foot surgery in the spring.      She sees her dermatologist every six months for her hx of squamous cell skin ca.      She has insomnia.  Zolpidem works well, but she's wondering if trazadone would be an option for her.  Her ADHD is stable on her current meds w/o SEs.      She has hyperlipidemia.  She's managing w/o stains.  She's  "fasting today for labs.    REVIEW OF SYSTEMS:   All other systems are negative.    TOBACCO USE:  History   Smoking Status     Former Smoker   Smokeless Tobacco     Not on file       VITALS:  Vitals:    11/30/17 0915   BP: 116/62   Patient Site: Right Arm   Patient Position: Sitting   Cuff Size: Adult Regular   Pulse: 62   SpO2: 97%   Weight: 135 lb (61.2 kg)   Height: 5' 4\" (1.626 m)     Wt Readings from Last 3 Encounters:   11/30/17 135 lb (61.2 kg)   06/21/17 143 lb (64.9 kg)   10/29/15 139 lb 12.8 oz (63.4 kg)         PHYSICAL EXAM:  Constitutional:  Reveals an alert, pleasant adult female.   Vitals:  Noted.   Psych: alert, oriented, appropriate      MEDICATIONS:  Current Outpatient Prescriptions   Medication Sig Dispense Refill     ascorbic acid (VITAMIN C) 1000 MG tablet Take 1,000 mg by mouth daily.       calcium & magnesium carbonates (MYLANTA) 311-232 mg per tablet Take 1 tablet by mouth daily.       co-enzyme Q-10 30 mg capsule Take 30 mg by mouth 3 (three) times a day.       dextroamphetamine (DEXEDRINE) 10 MG tablet Take 1 tablet (10 mg total) by mouth daily. 90 tablet 0     fluticasone (FLONASE) 50 mcg/actuation nasal spray 1 spray into each nostril 2 times a day at 6:00 am and 4:00 pm. 18 g 11     glucosamine-chondroitin 500-400 mg tablet Take 1 tablet by mouth 3 (three) times a day.       magnesium 30 mg tablet Take 30 mg by mouth 2 (two) times a day.       OMEGA-3/DHA/EPA/FISH OIL (FISH OIL-OMEGA-3 FATTY ACIDS) 300-1,000 mg capsule Take 2 g by mouth daily.       tretinoin (RETIN-A) 0.05 % cream   11     zolpidem (AMBIEN) 10 mg tablet Take 1/2-1 tablet at bedtime as needed. 90 tablet 0     traZODone (DESYREL) 100 MG tablet Take 1 tablet (100 mg total) by mouth at bedtime. 90 tablet 3     No current facility-administered medications for this visit.      "

## 2021-06-18 NOTE — PROGRESS NOTES
ASSESSMENT and PLAN:  1. Breast pain, right  She was tender today on exam.  I did not feel a discrete mass, however, her breast exam is limited by her implants.  We will proceed with diagnostic mammogram with ultrasound.  She did have a normal mammogram done last year but that was at [facility] and she will get us a copy.  - Mammo Diagnostic Right; Future  - US Breast Limited (Focal) Right; Future        There are no discontinued medications.    No Follow-up on file.    Patient Instructions   Please set up your mammogram and ultrasound:  [phone]    Take care!      CHIEF COMPLAINT:  Chief Complaint   Patient presents with     Breast Pain     right side       HISTORY OF PRESENT ILLNESS:  Erica Olson is a 67 y.o. female  presenting to the clinic today for evaluation of right-sided breast pain.  A few weeks ago at random, and she would get a sharp zinging type pain around her right breast on the side.  It felt like it was against her chest wall.  She never felt a lump there.  She did not notice any rash.  She did not have any nipple discharge or drainage.  She has bilateral implants.  Once she scheduled the appointment, her symptoms have improved some.    I show her as being overdue for mammogram however, she had one last year at [facility].    TOBACCO USE:  History   Smoking Status     Former Smoker   Smokeless Tobacco     Never Used       VITALS:  Vitals:    05/22/18 1132   BP: 122/62   Patient Site: Left Arm   Patient Position: Sitting   Cuff Size: Adult Regular   Pulse: 72   SpO2: 98%   Weight: 138 lb (62.6 kg)     Wt Readings from Last 3 Encounters:   05/22/18 138 lb (62.6 kg)   11/30/17 135 lb (61.2 kg)   06/21/17 143 lb (64.9 kg)         PHYSICAL EXAM:  Constitutional:  Reveals an alert, pleasant adult female.   Vitals:  Noted.   Breasts: On inspection, breasts are symmetric, there is no dimpling.  On physical exam, the right breast is tender at approximately the 10 o'clock position laterally.  I did not appreciate  any mass there, but the area is where her breast implant meets her chest wall.  No nipple discharge or drainage is noted    MEDICATIONS:  Current Outpatient Prescriptions   Medication Sig Dispense Refill     ascorbic acid (VITAMIN C) 1000 MG tablet Take 1,000 mg by mouth daily.       dextroamphetamine (DEXEDRINE) 10 MG tablet Take 1 tablet (10 mg total) by mouth daily. 90 tablet 0     traZODone (DESYREL) 100 MG tablet Take 1 tablet (100 mg total) by mouth at bedtime. 90 tablet 3     zolpidem (AMBIEN) 10 mg tablet Take 1/2-1 tablet at bedtime as needed. 90 tablet 0     calcium & magnesium carbonates (MYLANTA) 311-232 mg per tablet Take 1 tablet by mouth daily.       co-enzyme Q-10 30 mg capsule Take 30 mg by mouth 3 (three) times a day.       fluticasone (FLONASE) 50 mcg/actuation nasal spray 1 spray into each nostril 2 times a day at 6:00 am and 4:00 pm. 18 g 11     glucosamine-chondroitin 500-400 mg tablet Take 1 tablet by mouth 3 (three) times a day.       magnesium 30 mg tablet Take 30 mg by mouth 2 (two) times a day.       OMEGA-3/DHA/EPA/FISH OIL (FISH OIL-OMEGA-3 FATTY ACIDS) 300-1,000 mg capsule Take 2 g by mouth daily.       tretinoin (RETIN-A) 0.05 % cream   11     No current facility-administered medications for this visit.

## 2021-06-20 NOTE — LETTER
Letter by Toby Nebsitt MD at      Author: Toby Nesbitt MD Service: -- Author Type: --    Filed:  Encounter Date: 9/28/2020 Status: (Other)         Ana Coello MD  [address] [facility] [address]                                  September 28, 2020    Patient: Erica Olson   MR Number: [id number]   YOB: 1950   Date of Visit: 9/28/2020     Dear Dr. Mode MD:    Thank you for referring Erica Olson to me for evaluation. Below are the relevant portions of my assessment and plan of care.    If you have questions, please do not hesitate to call me. I look forward to following Erica along with you.    Sincerely,        Toby Nesbitt MD          CC  No Recipients  Toby Nesbitt MD  9/28/2020 12:06 PM  Sign when Signing Visit  Assessment and Plan:Erica Olson is a 70 y.o. F with a past medical history significant for recurrent sinusitis and bronchiectasis who presents to clinic today for follow up.  She has a history of bronchiectasis and has struggled with a bronchopulmonary episode lasting months in the past.  She recently struggled with her sinusitis again and wanted to make sure her lung health was preserved.  Her spirometry and DLCO are completely normal.  Her lungs sound clear, and her symptoms are reassuring.  We discussed how exercise and activity are excellent ways to preserve lung health with bronchiectasis.  She does not require any medication for maintenance.  I provided a new aerobika today to use to augment her airway clearance if coughing which should help prevent lower airways infections, especially if struggling with sinusitis..   1. Bronchiectasis - mild with no evidence of obstruction on PFTs.  Routine mainteance with airway clearance using exercise, or aerobika.  If she develops a cough with chest congestion, would have a low threshold to treat with antibiotics given a propensity to develop bronchopneumonia.  2. RTC PRN      CCx:  bronchiectasis checkup    HPI: Ms. Olson is a 70 year old female, last seen in this clinic over 5 years ago, so this is a new visit for us.  She is being seen for bronchiectasis follow up.  On her last visit she was struggling with sinusitis that went to her chest and resulted in mutiple rounds of antibiotics witih vigorous coughing before it finally shook loose.  This spring and summer she had another challenge with sinusitis acting up, and this required multiple rounds of antibiotics again.  Her lungs seemed to do better this time, and for the last four weeks she feels her cough has been good.  She does not think it necessarily went into her lungs this time, but wants to be sure her lungs are OK.  She is not short of breath at a baseline and has no wheezing.  Her cough is not usually productive, but when she had it, it felt like it comes from her chest and not her throat.  She has not had fevers.  She was a distant smoker.  She notes she had been seen at [facility] where CTs revealed a 4mm nodule that on follow up hadn't changed.  She is not a vigorous exercise, but stays active.    PMH:  No past medical history on file.    PSH:  Past Surgical History:   Procedure Laterality Date   ? AUGMENTATION MAMMAPLASTY     ? BREAST CYST ASPIRATION Right    ? BREAST CYST EXCISION Right    ? HYSTERECTOMY Bilateral 1998   ? OOPHORECTOMY     ? DC ARTHROPLASTY TIBIAL PLATEAU      Description: Knee Replacement;  Proc Date: 05/01/2010;  Comments: Partial   ? DC ARTHROPLASTY TIBIAL PLATEAU      Description: Knee Replacement;  Proc Date: 01/01/2007;  Comments: Partial   ? DC ENLARGE BREAST      Description: Breast Surgery Enlargement Procedure;  Recorded: 07/02/2009;   ? DC LIGATE FALLOPIAN TUBE      Description: Tubal Ligation;  Recorded: 07/24/2009;       SH:  Social History     Socioeconomic History   ? Marital status:      Spouse name: Not on file   ? Number of children: Not on file   ? Years of education: Not on file   ?  Highest education level: Not on file   Occupational History   ? Not on file   Social Needs   ? Financial resource strain: Not on file   ? Food insecurity     Worry: Not on file     Inability: Not on file   ? Transportation needs     Medical: Not on file     Non-medical: Not on file   Tobacco Use   ? Smoking status: Former Smoker   ? Smokeless tobacco: Never Used   Substance and Sexual Activity   ? Alcohol use: Not on file   ? Drug use: Not on file   ? Sexual activity: Not on file   Lifestyle   ? Physical activity     Days per week: Not on file     Minutes per session: Not on file   ? Stress: Not on file   Relationships   ? Social connections     Talks on phone: Not on file     Gets together: Not on file     Attends Yazdanism service: Not on file     Active member of club or organization: Not on file     Attends meetings of clubs or organizations: Not on file     Relationship status: Not on file   ? Intimate partner violence     Fear of current or ex partner: Not on file     Emotionally abused: Not on file     Physically abused: Not on file     Forced sexual activity: Not on file   Other Topics Concern   ? Not on file   Social History Narrative   ? Not on file       Family history:  No family history on file.  The family history was reviewed and is not pertinent to the chief complaint or HPI.    ROS:  Review of Systems - History obtained from the patient  General ROS: negative  Psychological ROS: negative  ENT ROS: negative  Allergy and Immunology ROS: negative  Endocrine ROS: negative  Respiratory ROS: positive for - cough  negative for - sputum changes, stridor, tachypnea or wheezing  Cardiovascular ROS: no chest pain or palpitations  Gastrointestinal ROS: no abdominal pain, change in bowel habits, or black or bloody stools  Genito-Urinary ROS: no dysuria, trouble voiding, or hematuria  Musculoskeletal ROS: negative  Neurological ROS: no TIA or stroke symptoms  Dermatological ROS: negative      Current  Meds:  Current Outpatient Medications   Medication Sig   ? ascorbic acid (VITAMIN C) 1000 MG tablet Take 1,000 mg by mouth daily.   ? azelastine (ASTELIN) 137 mcg (0.1 %) nasal spray 2 sprays 2 (two) times a day.   ? busPIRone (BUSPAR) 5 MG tablet Take 5 mg by mouth daily.   ? calcium & magnesium carbonates (MYLANTA) 311-232 mg per tablet Take 1 tablet by mouth daily.   ? co-enzyme Q-10 30 mg capsule Take 30 mg by mouth 3 (three) times a day.   ? dextroamphetamine (DEXEDRINE) 10 MG tablet Take 1 tablet (10 mg total) by mouth daily.   ? FLUoxetine (PROZAC) 20 MG capsule Take 20 mg by mouth daily.   ? fluticasone (FLONASE) 50 mcg/actuation nasal spray 1 spray into each nostril 2 times a day at 6:00 am and 4:00 pm.   ? glucosamine-chondroitin 500-400 mg tablet Take 1 tablet by mouth 3 (three) times a day.   ? magnesium 30 mg tablet Take 30 mg by mouth 2 (two) times a day.   ? OMEGA-3/DHA/EPA/FISH OIL (FISH OIL-OMEGA-3 FATTY ACIDS) 300-1,000 mg capsule Take 2 g by mouth daily.   ? zolpidem (AMBIEN) 10 mg tablet Take 1/2-1 tablets at bedtime as  needed       Labs:  Recent Results (from the past 72 hour(s))   POCT hemoglobin   Result Value Ref Range    Hgb 12.7 7.0 g/dL       I have personally reviewed all imaging and PFT data available pertinent to this visit.    Imaging studies:  Mammo Diagnostic Bilateral    Result Date: 5/29/2018  MAMMO DIAGNOSTIC 2D BILATERAL, US BREAST LIMITED (FOCAL) RIGHT 5/29/2018 1:30 PM INDICATION: Right upper outer quadrant breast pain. COMPARISON: 12/14/2011 and older studies. MAMMOGRAPHIC FINDINGS: Bilateral full-field digital diagnostic mammograms performed. The breasts are heterogeneously dense, which may obscure small masses. Implants present bilaterally. Scattered overlying glandular tissue. A few benign calcifications. No suspicious lesions. Images evaluated with the assistance of CAD. Scans performed in the area of pain identified by the patient as being within the entire right upper  "outer quadrant. Scans through this region demonstrate normal glandular tissue overlying the implant. No suspicious abnormality evident.     No sonographic or mammographic evidence for malignancy. Further workup should be based on suspicion of clinical exam. ACR BI-RADS Category 2: Benign. Results given to the patient who should resume routine screening mammography.    Us Breast Limited (focal) Right    Result Date: 5/29/2018  MAMMO DIAGNOSTIC 2D BILATERAL, US BREAST LIMITED (FOCAL) RIGHT 5/29/2018 1:30 PM INDICATION: Right upper outer quadrant breast pain. COMPARISON: 12/14/2011 and older studies. MAMMOGRAPHIC FINDINGS: Bilateral full-field digital diagnostic mammograms performed. The breasts are heterogeneously dense, which may obscure small masses. Implants present bilaterally. Scattered overlying glandular tissue. A few benign calcifications. No suspicious lesions. Images evaluated with the assistance of CAD. Scans performed in the area of pain identified by the patient as being within the entire right upper outer quadrant. Scans through this region demonstrate normal glandular tissue overlying the implant. No suspicious abnormality evident.     No sonographic or mammographic evidence for malignancy. Further workup should be based on suspicion of clinical exam. ACR BI-RADS Category 2: Benign. Results given to the patient who should resume routine screening mammography.      PFTs:  FEV1/FVC is 76% and is normal.  FEV1 is 2.19L (103%) predicted and is normal.  FVC is 2.89L (105%) predicted and normal.  There was no improvement in spirometry after a single inhaled dose of bronchodilator.    DLCO is 24.42ml/min/hg (128%) predicted and is increased when it is corrected for hemoglobin.  Flow volume loops indicate .    Impression:  Spirometry and DLCO are normal  Toby Nesbitt          Physical Exam:  /64   Pulse (!) 58   Ht 5' 4\" (1.626 m)   Wt 135 lb (61.2 kg)   SpO2 97% Comment: RA  BMI 23.17 kg/m  "   General - Well nourished  Ears/Mouth - deferred given mask use  Lungs - Clear to ausculation bilaterally   CVS - regular rhythm with no murmurs, rubs or gallups  Ext - no cyanosis, clubbing or edema  Skin - no rash  Psychology - alert and oriented, answers appropriate        Electronically signed by:    Toby Nesbitt MD PhD  [facility] Pulmonary and Critical Care Medicine

## 2021-06-20 NOTE — PROGRESS NOTES
Assessment and Plan:     1. ADHD (attention deficit hyperactivity disorder)  Stable on current meds without ill side effects.  Her controlled substance agreement is updated and a urine drug screen will be obtained today.  - dextroamphetamine (DEXEDRINE) 10 MG tablet; Take 1 tablet (10 mg total) by mouth daily.  Dispense: 90 tablet; Refill: 0  - Drug Abuse 1+, Urine    2. Insomnia  Still works well for her.  Controlled substance agreement is updated as is a urine drug screen.  - zolpidem (AMBIEN) 10 mg tablet; Take 1/2-1 tablet at bedtime as needed.  Dispense: 90 tablet; Refill: 0    3. Screen for colon cancer  You know she is due and will plan to set that up this year.  - Ambulatory referral for Colonoscopy    4. Routine general medical examination at a health care facility  She is up-to-date on immunizations with the exception of the shingles immunization.  She will get us a copy of her advance care directive or look into resources to do that.  Her mammogram is up-to-date.  If she has any ongoing right breast pain, we discussed a breast MRI as the next step in evaluation.  She will schedule her colonoscopy this year.    5. Menopause  She is due for bone density.  - DXA Bone Density Scan; Future    6. Medication monitoring encounter  Urine drug screen is obtained today due to her controlled substance agreement.  - Drug Abuse 1+, Urine    7. Bronchiectasis without complication (H)  She is asymptomatic.  She is up-to-date on her pneumococcal immunizations.    8. Mixed hyperlipidemia  Recent blood work was reviewed.  No changes in management.    The patient's current medical problems were reviewed.    The following health maintenance schedule was reviewed with the patient and provided in printed form in the after visit summary:   Health Maintenance   Topic Date Due     DEPRESSION FOLLOW UP  1950     ZOSTER VACCINE  07/21/2008     DXA SCAN  07/21/2013     COLONOSCOPY  05/25/2016     INFLUENZA VACCINE RULE BASED  (1) 08/01/2018     FALL RISK ASSESSMENT  08/23/2019     MAMMOGRAM  05/29/2020     TD 18+ HE  08/27/2020     ADVANCE DIRECTIVES DISCUSSED WITH PATIENT  10/29/2020     PNEUMOCOCCAL POLYSACCHARIDE VACCINE AGE 65 AND OVER  Completed     PNEUMOCOCCAL CONJUGATE VACCINE FOR ADULTS (PCV13 OR PREVNAR)  Completed        Subjective:   Chief Complaint: Erica Olson is an 68 y.o. female here for an Annual Wellness visit.   HPI: Erica is a very pleasant 68-year-old female here today for annual physical.  She is doing well.  She wanted me to review the recommendation from [place] regarding follow-up of benign-appearing lung nodules that have been stable for 2 years.  I agree that no additional follow-up is needed that she is a non-smoker.    She was seen for right breast pain in May.  Mammogram was negative.  Her symptoms have greatly improved.  She now only notices discomfort when she pushes firmly on her right lateral breast.  She does not feel any mass there.    Review of Systems:    Please see above.  The rest of the review of systems are negative for all systems.    Patient Care Team:  Shakila Coleman MD as PCP - General     Patient Active Problem List   Diagnosis     Mixed hyperlipidemia     Osteopenia     Postsurgical Acquired Absence Of Genitalia     Dysthymic Disorder     Insomnia     Obstructive Sleep Apnea     Bronchiectasis (H)     No past medical history on file.   Past Surgical History:   Procedure Laterality Date     AUGMENTATION MAMMAPLASTY       BREAST CYST ASPIRATION Right      BREAST CYST EXCISION Right      HYSTERECTOMY Bilateral 1998     OOPHORECTOMY       [place] ARTHROPLASTY TIBIAL PLATEAU      Description: Knee Replacement;  Proc Date: 05/01/2010;  Comments: Partial     [place] ARTHROPLASTY TIBIAL PLATEAU      Description: Knee Replacement;  Proc Date: 01/01/2007;  Comments: Partial     [place] ENLARGE BREAST      Description: Breast Surgery Enlargement Procedure;  Recorded: 07/02/2009;     [place] LIGATE FALLOPIAN TUBE    "   Description: Tubal Ligation;  Recorded: 07/24/2009;      No family history on file.   Social History     Social History     Marital status:      Spouse name: N/A     Number of children: N/A     Years of education: N/A     Occupational History     Not on file.     Social History Main Topics     Smoking status: Former Smoker     Smokeless tobacco: Never Used     Alcohol use Not on file     Drug use: Not on file     Sexual activity: Not on file     Other Topics Concern     Not on file     Social History Narrative      Current Outpatient Prescriptions   Medication Sig Dispense Refill     ascorbic acid (VITAMIN C) 1000 MG tablet Take 1,000 mg by mouth daily.       calcium & magnesium carbonates (MYLANTA) 311-232 mg per tablet Take 1 tablet by mouth daily.       co-enzyme Q-10 30 mg capsule Take 30 mg by mouth 3 (three) times a day.       fluticasone (FLONASE) 50 mcg/actuation nasal spray 1 spray into each nostril 2 times a day at 6:00 am and 4:00 pm. 18 g 11     glucosamine-chondroitin 500-400 mg tablet Take 1 tablet by mouth 3 (three) times a day.       magnesium 30 mg tablet Take 30 mg by mouth 2 (two) times a day.       OMEGA-3/DHA/EPA/FISH OIL (FISH OIL-OMEGA-3 FATTY ACIDS) 300-1,000 mg capsule Take 2 g by mouth daily.       dextroamphetamine (DEXEDRINE) 10 MG tablet Take 1 tablet (10 mg total) by mouth daily. 90 tablet 0     zolpidem (AMBIEN) 10 mg tablet Take 1/2-1 tablet at bedtime as needed. 90 tablet 0     No current facility-administered medications for this visit.       Objective:   Vital Signs:   Visit Vitals     /62 (Patient Site: Right Arm, Patient Position: Sitting, Cuff Size: Adult Regular)     Pulse (!) 57     Ht 5' 3.58\" (1.615 m)     Wt 134 lb 14.4 oz (61.2 kg)     SpO2 98%     BMI 23.46 kg/m2        VisionScreening:  No exam data present     PHYSICAL EXAM:  Constitutional:  Reveals an alert, pleasant adult female.   Vitals:  Noted.   Ears: TM's normal bilaterally   Eyes: PERRL, " conjunctiva/corneas clear, EOM's intact   Throat: Lips, mucosa, and tongue normal; teeth and gums normal   Neck: Normal ROM, no carotid bruits, no thyromegaly   Lungs: Clear to auscultation bilaterally, respirations unlabored.   Breast exam:  Normal skin overlying her breasts bilaterally no discrete nodule is palpable in the axilla bilaterally aside from implants  Heart: Regular rate and rhythm, S1 and S2 normal, no murmur, rub, or gallop,   Abdomen: Soft, non-tender, bowel sounds active, no masses, no organomegaly   Extremities: Extremities normal, atraumatic, no cyanosis or edema   Pelvic:Not examined  Skin: Skin color, texture, turgor normal, no rashes or lesions   Neurologic: Normal       Assessment Results 8/23/2018   Activities of Daily Living No help needed   Instrumental Activities of Daily Living No help needed   Mini Cog Total Score 5   Some recent data might be hidden     A Mini-Cog score of 0-2 suggests the possibility of dementia, score of 3-5 suggests no dementia    Identified Health Risks:     She is at risk for lack of exercise and has been provided with information to increase physical activity for the benefit of her well-being.  Patient's advanced directive was discussed and I am comfortable with the patient's wishes.

## 2021-06-20 NOTE — LETTER
Letter by Toby Nesbitt MD at      Author: Toby Nesbitt MD Service: -- Author Type: --    Filed:  Encounter Date: 9/3/2020 Status: (Other)         Erica Olson  [address] [facility] [address]    September 3, 2020    Dear Ms. Olson,    Welcome to [facility]! Your appointment information is below.   Please bring the following to your appointment:    Insurance Card, so we may scan it for our records    Drivers license or valid ID, so we may scan it for our records    Co-pay (as applicable per your insurance plan)    A current list of your medications including over the counter products such as vitamins and supplements    Your medical records including copies of X-Ray films if you are transferring your care from another clinic.  If you do not have your records, please fill out the release of information form and we will request those records.     Provider: Toby Nesbitt MD  Appointment Date:   Monday, September 28, 2020  Arrival Time:   10:00 PFT,  11:00 dr appt.    Location: [address]        [facility], [address]    **Please allow adequate time for your commute and parking. If you are more than 10 minutes late, you may be asked to reschedule.     If you need to cancel or reschedule your appointment, please notify us at least 24 hours prior to your appointment time so we are able to make this time available for another patient.    Thank you for choosing the [facility] for your health care needs. If you have any questions, please do not hesitate to contact us at any time at   [phone]. We look forward to caring for you.     Sincerely,     [facility] staff

## 2021-06-22 NOTE — TELEPHONE ENCOUNTER
----- Message from Shakila Coleman MD sent at 1/4/2019  3:46 PM CST -----  Please her know that her FIT test for colon cancer screen was negative.  A repeat in one year is recommended.  (Please also make sure her HMP is up to date, thank you!).

## 2021-06-22 NOTE — TELEPHONE ENCOUNTER
Patient Returning Call  Reason for call:  results  Information relayed to patient:  Writer relayed below message to patient.  Patient verbalized understanding.  Patient has additional questions:  No  If YES, what are your questions/concerns:  N/A  Okay to leave a detailed message?: No call back needed

## 2021-06-24 NOTE — TELEPHONE ENCOUNTER
Medication Question or Clarification  Who is calling: Pharmacy: fax  What medication are you calling about? (include dose and sig) dextroamphetamine (DEXEDRINE) 10 MG tablet - take 1 tablet daily  Who prescribed the medication?: Shakila Coleman MD/Moe Perea MD  What is your question/concern?: Pharmacy faxing stating insurance will only allow 30 day refills at a time.  Pharmacy: [facility] #[id number]  Okay to leave a detailed message?: No  Site CMT - Please call the pharmacy to obtain any additional needed information.

## 2021-07-13 ENCOUNTER — RECORDS - HEALTHEAST (OUTPATIENT)
Dept: ADMINISTRATIVE | Facility: CLINIC | Age: 71
End: 2021-07-13

## 2021-07-21 ENCOUNTER — RECORDS - HEALTHEAST (OUTPATIENT)
Dept: ADMINISTRATIVE | Facility: CLINIC | Age: 71
End: 2021-07-21

## 2021-07-25 ENCOUNTER — HEALTH MAINTENANCE LETTER (OUTPATIENT)
Age: 71
End: 2021-07-25

## 2021-09-19 ENCOUNTER — HEALTH MAINTENANCE LETTER (OUTPATIENT)
Age: 71
End: 2021-09-19

## 2021-10-04 ENCOUNTER — LAB REQUISITION (OUTPATIENT)
Dept: LAB | Facility: CLINIC | Age: 71
End: 2021-10-04
Payer: MEDICARE

## 2021-10-04 DIAGNOSIS — R53.83 OTHER FATIGUE: ICD-10-CM

## 2021-10-04 DIAGNOSIS — Z86.39 PERSONAL HISTORY OF OTHER ENDOCRINE, NUTRITIONAL AND METABOLIC DISEASE: ICD-10-CM

## 2021-10-04 LAB
ALBUMIN SERPL-MCNC: 4 G/DL (ref 3.5–5)
ALP SERPL-CCNC: 76 U/L (ref 45–120)
ALT SERPL W P-5'-P-CCNC: 17 U/L (ref 0–45)
ANION GAP SERPL CALCULATED.3IONS-SCNC: 16 MMOL/L (ref 5–18)
AST SERPL W P-5'-P-CCNC: 22 U/L (ref 0–40)
BILIRUB SERPL-MCNC: 0.6 MG/DL (ref 0–1)
BUN SERPL-MCNC: 16 MG/DL (ref 8–28)
CALCIUM SERPL-MCNC: 9.7 MG/DL (ref 8.5–10.5)
CHLORIDE BLD-SCNC: 102 MMOL/L (ref 98–107)
CO2 SERPL-SCNC: 22 MMOL/L (ref 22–31)
CREAT SERPL-MCNC: 0.72 MG/DL (ref 0.6–1.1)
ERYTHROCYTE [DISTWIDTH] IN BLOOD BY AUTOMATED COUNT: 12.1 % (ref 10–15)
GFR SERPL CREATININE-BSD FRML MDRD: 83 ML/MIN/1.73M2
GLUCOSE BLD-MCNC: 100 MG/DL (ref 70–125)
HCT VFR BLD AUTO: 41.9 % (ref 35–47)
HGB BLD-MCNC: 14.1 G/DL (ref 11.7–15.7)
MCH RBC QN AUTO: 31.5 PG (ref 26.5–33)
MCHC RBC AUTO-ENTMCNC: 33.7 G/DL (ref 31.5–36.5)
MCV RBC AUTO: 94 FL (ref 78–100)
PLATELET # BLD AUTO: 245 10E3/UL (ref 150–450)
POTASSIUM BLD-SCNC: 4.5 MMOL/L (ref 3.5–5)
PROT SERPL-MCNC: 6.8 G/DL (ref 6–8)
RBC # BLD AUTO: 4.48 10E6/UL (ref 3.8–5.2)
SODIUM SERPL-SCNC: 140 MMOL/L (ref 136–145)
TSH SERPL DL<=0.005 MIU/L-ACNC: 1.84 UIU/ML (ref 0.3–5)
VIT B12 SERPL-MCNC: 312 PG/ML (ref 213–816)
WBC # BLD AUTO: 7 10E3/UL (ref 4–11)

## 2021-10-04 PROCEDURE — 80053 COMPREHEN METABOLIC PANEL: CPT | Mod: ORL | Performed by: FAMILY MEDICINE

## 2021-10-04 PROCEDURE — 82306 VITAMIN D 25 HYDROXY: CPT | Mod: ORL | Performed by: FAMILY MEDICINE

## 2021-10-04 PROCEDURE — 84443 ASSAY THYROID STIM HORMONE: CPT | Mod: ORL | Performed by: FAMILY MEDICINE

## 2021-10-04 PROCEDURE — 82607 VITAMIN B-12: CPT | Mod: ORL | Performed by: FAMILY MEDICINE

## 2021-10-04 PROCEDURE — 85027 COMPLETE CBC AUTOMATED: CPT | Mod: ORL | Performed by: FAMILY MEDICINE

## 2021-10-05 LAB — DEPRECATED CALCIDIOL+CALCIFEROL SERPL-MC: 52 UG/L (ref 30–80)

## 2022-08-21 ENCOUNTER — HEALTH MAINTENANCE LETTER (OUTPATIENT)
Age: 72
End: 2022-08-21

## 2022-11-07 ENCOUNTER — LAB REQUISITION (OUTPATIENT)
Dept: LAB | Facility: CLINIC | Age: 72
End: 2022-11-07
Payer: MEDICARE

## 2022-11-07 DIAGNOSIS — Z13.6 ENCOUNTER FOR SCREENING FOR CARDIOVASCULAR DISORDERS: ICD-10-CM

## 2022-11-07 DIAGNOSIS — M81.0 AGE-RELATED OSTEOPOROSIS WITHOUT CURRENT PATHOLOGICAL FRACTURE: ICD-10-CM

## 2022-11-07 DIAGNOSIS — R00.2 PALPITATIONS: ICD-10-CM

## 2022-11-07 LAB
ERYTHROCYTE [DISTWIDTH] IN BLOOD BY AUTOMATED COUNT: 11.7 % (ref 10–15)
HCT VFR BLD AUTO: 43.9 % (ref 35–47)
HGB BLD-MCNC: 14.4 G/DL (ref 11.7–15.7)
MCH RBC QN AUTO: 29.8 PG (ref 26.5–33)
MCHC RBC AUTO-ENTMCNC: 32.8 G/DL (ref 31.5–36.5)
MCV RBC AUTO: 91 FL (ref 78–100)
PLATELET # BLD AUTO: 266 10E3/UL (ref 150–450)
RBC # BLD AUTO: 4.83 10E6/UL (ref 3.8–5.2)
WBC # BLD AUTO: 7 10E3/UL (ref 4–11)

## 2022-11-07 PROCEDURE — 80061 LIPID PANEL: CPT | Mod: ORL | Performed by: FAMILY MEDICINE

## 2022-11-07 PROCEDURE — 82306 VITAMIN D 25 HYDROXY: CPT | Mod: ORL | Performed by: FAMILY MEDICINE

## 2022-11-07 PROCEDURE — 85027 COMPLETE CBC AUTOMATED: CPT | Mod: ORL | Performed by: FAMILY MEDICINE

## 2022-11-07 PROCEDURE — 80053 COMPREHEN METABOLIC PANEL: CPT | Mod: ORL | Performed by: FAMILY MEDICINE

## 2022-11-07 PROCEDURE — 84443 ASSAY THYROID STIM HORMONE: CPT | Mod: ORL | Performed by: FAMILY MEDICINE

## 2022-11-08 LAB
ALBUMIN SERPL BCG-MCNC: 4.5 G/DL (ref 3.5–5.2)
ALP SERPL-CCNC: 80 U/L (ref 35–104)
ALT SERPL W P-5'-P-CCNC: 28 U/L (ref 10–35)
ANION GAP SERPL CALCULATED.3IONS-SCNC: 18 MMOL/L (ref 7–15)
AST SERPL W P-5'-P-CCNC: 27 U/L (ref 10–35)
BILIRUB SERPL-MCNC: 0.4 MG/DL
BUN SERPL-MCNC: 17.3 MG/DL (ref 8–23)
CALCIUM SERPL-MCNC: 9.8 MG/DL (ref 8.8–10.2)
CHLORIDE SERPL-SCNC: 97 MMOL/L (ref 98–107)
CHOLEST SERPL-MCNC: 257 MG/DL
CREAT SERPL-MCNC: 0.73 MG/DL (ref 0.51–0.95)
DEPRECATED CALCIDIOL+CALCIFEROL SERPL-MC: 70 UG/L (ref 20–75)
DEPRECATED HCO3 PLAS-SCNC: 23 MMOL/L (ref 22–29)
GFR SERPL CREATININE-BSD FRML MDRD: 86 ML/MIN/1.73M2
GLUCOSE SERPL-MCNC: 95 MG/DL (ref 70–99)
HDLC SERPL-MCNC: 75 MG/DL
LDLC SERPL CALC-MCNC: 165 MG/DL
NONHDLC SERPL-MCNC: 182 MG/DL
POTASSIUM SERPL-SCNC: 4.5 MMOL/L (ref 3.4–5.3)
PROT SERPL-MCNC: 6.9 G/DL (ref 6.4–8.3)
SODIUM SERPL-SCNC: 138 MMOL/L (ref 136–145)
TRIGL SERPL-MCNC: 85 MG/DL
TSH SERPL DL<=0.005 MIU/L-ACNC: 2.18 UIU/ML (ref 0.3–4.2)

## 2022-11-21 ENCOUNTER — HOSPITAL ENCOUNTER (OUTPATIENT)
Dept: CT IMAGING | Facility: CLINIC | Age: 72
Discharge: HOME | End: 2022-11-21
Attending: FAMILY MEDICINE | Admitting: FAMILY MEDICINE

## 2022-11-21 ENCOUNTER — HEALTH MAINTENANCE LETTER (OUTPATIENT)
Age: 72
End: 2022-11-21

## 2022-11-21 DIAGNOSIS — Z86.39 HISTORY OF HIGH CHOLESTEROL: ICD-10-CM

## 2022-11-21 DIAGNOSIS — R00.2 PALPITATIONS: ICD-10-CM

## 2022-11-21 LAB
CV CALCIUM SCORE AGATSTON LM: 0
CV CALCIUM SCORING AGATSON LAD: 19
CV CALCIUM SCORING AGATSTON CX: 1
CV CALCIUM SCORING AGATSTON RCA: 87
CV CALCIUM SCORING AGATSTON TOTAL: 107

## 2022-11-21 PROCEDURE — 75571 CT HRT W/O DYE W/CA TEST: CPT | Mod: 26 | Performed by: INTERNAL MEDICINE

## 2022-11-21 PROCEDURE — 75571 CT HRT W/O DYE W/CA TEST: CPT

## 2022-11-22 LAB — RADIOLOGIST FLAGS: NORMAL

## 2023-04-06 ENCOUNTER — LAB REQUISITION (OUTPATIENT)
Dept: LAB | Facility: CLINIC | Age: 73
End: 2023-04-06

## 2023-04-06 DIAGNOSIS — Z01.818 ENCOUNTER FOR OTHER PREPROCEDURAL EXAMINATION: ICD-10-CM

## 2023-04-06 LAB
ANION GAP SERPL CALCULATED.3IONS-SCNC: 14 MMOL/L (ref 7–15)
BUN SERPL-MCNC: 16.8 MG/DL (ref 8–23)
CALCIUM SERPL-MCNC: 9.4 MG/DL (ref 8.8–10.2)
CHLORIDE SERPL-SCNC: 103 MMOL/L (ref 98–107)
CREAT SERPL-MCNC: 0.74 MG/DL (ref 0.51–0.95)
DEPRECATED HCO3 PLAS-SCNC: 23 MMOL/L (ref 22–29)
GFR SERPL CREATININE-BSD FRML MDRD: 84 ML/MIN/1.73M2
GLUCOSE SERPL-MCNC: 103 MG/DL (ref 70–99)
POTASSIUM SERPL-SCNC: 4.9 MMOL/L (ref 3.4–5.3)
SODIUM SERPL-SCNC: 140 MMOL/L (ref 136–145)

## 2023-04-06 PROCEDURE — 80048 BASIC METABOLIC PNL TOTAL CA: CPT | Performed by: PHYSICIAN ASSISTANT

## 2023-09-16 ENCOUNTER — HEALTH MAINTENANCE LETTER (OUTPATIENT)
Age: 73
End: 2023-09-16

## 2023-09-28 ENCOUNTER — LAB REQUISITION (OUTPATIENT)
Dept: LAB | Facility: CLINIC | Age: 73
End: 2023-09-28

## 2023-09-28 DIAGNOSIS — R53.83 OTHER FATIGUE: ICD-10-CM

## 2023-09-28 LAB
ALBUMIN SERPL BCG-MCNC: 4.5 G/DL (ref 3.5–5.2)
ALP SERPL-CCNC: 73 U/L (ref 35–104)
ALT SERPL W P-5'-P-CCNC: 26 U/L (ref 0–50)
ANION GAP SERPL CALCULATED.3IONS-SCNC: 14 MMOL/L (ref 7–15)
AST SERPL W P-5'-P-CCNC: 26 U/L (ref 0–45)
BASOPHILS # BLD AUTO: 0.1 10E3/UL (ref 0–0.2)
BASOPHILS NFR BLD AUTO: 1 %
BILIRUB SERPL-MCNC: 0.5 MG/DL
BUN SERPL-MCNC: 16.4 MG/DL (ref 8–23)
CALCIUM SERPL-MCNC: 9.6 MG/DL (ref 8.8–10.2)
CHLORIDE SERPL-SCNC: 101 MMOL/L (ref 98–107)
CREAT SERPL-MCNC: 0.76 MG/DL (ref 0.51–0.95)
EGFRCR SERPLBLD CKD-EPI 2021: 81 ML/MIN/1.73M2
EOSINOPHIL # BLD AUTO: 0.2 10E3/UL (ref 0–0.7)
EOSINOPHIL NFR BLD AUTO: 2 %
ERYTHROCYTE [DISTWIDTH] IN BLOOD BY AUTOMATED COUNT: 12.2 % (ref 10–15)
GLUCOSE SERPL-MCNC: 86 MG/DL (ref 70–99)
HCO3 SERPL-SCNC: 23 MMOL/L (ref 22–29)
HCT VFR BLD AUTO: 43.6 % (ref 35–47)
HGB BLD-MCNC: 14.5 G/DL (ref 11.7–15.7)
IMM GRANULOCYTES # BLD: 0 10E3/UL
IMM GRANULOCYTES NFR BLD: 1 %
LYMPHOCYTES # BLD AUTO: 2 10E3/UL (ref 0.8–5.3)
LYMPHOCYTES NFR BLD AUTO: 24 %
MCH RBC QN AUTO: 30.8 PG (ref 26.5–33)
MCHC RBC AUTO-ENTMCNC: 33.3 G/DL (ref 31.5–36.5)
MCV RBC AUTO: 93 FL (ref 78–100)
MONOCYTES # BLD AUTO: 0.6 10E3/UL (ref 0–1.3)
MONOCYTES NFR BLD AUTO: 8 %
NEUTROPHILS # BLD AUTO: 5.1 10E3/UL (ref 1.6–8.3)
NEUTROPHILS NFR BLD AUTO: 64 %
NRBC # BLD AUTO: 0 10E3/UL
NRBC BLD AUTO-RTO: 0 /100
PLATELET # BLD AUTO: 263 10E3/UL (ref 150–450)
POTASSIUM SERPL-SCNC: 4.3 MMOL/L (ref 3.4–5.3)
PROT SERPL-MCNC: 7.2 G/DL (ref 6.4–8.3)
RBC # BLD AUTO: 4.71 10E6/UL (ref 3.8–5.2)
SODIUM SERPL-SCNC: 138 MMOL/L (ref 135–145)
T4 FREE SERPL-MCNC: 1.33 NG/DL (ref 0.9–1.7)
TSH SERPL DL<=0.005 MIU/L-ACNC: 2.53 UIU/ML (ref 0.3–4.2)
WBC # BLD AUTO: 8 10E3/UL (ref 4–11)

## 2023-09-28 PROCEDURE — 86618 LYME DISEASE ANTIBODY: CPT | Performed by: PHYSICIAN ASSISTANT

## 2023-09-28 PROCEDURE — 84439 ASSAY OF FREE THYROXINE: CPT | Performed by: PHYSICIAN ASSISTANT

## 2023-09-28 PROCEDURE — 80053 COMPREHEN METABOLIC PANEL: CPT | Performed by: PHYSICIAN ASSISTANT

## 2023-09-28 PROCEDURE — 85025 COMPLETE CBC W/AUTO DIFF WBC: CPT | Performed by: PHYSICIAN ASSISTANT

## 2023-09-28 PROCEDURE — 84443 ASSAY THYROID STIM HORMONE: CPT | Performed by: PHYSICIAN ASSISTANT

## 2023-09-29 LAB — B BURGDOR IGG+IGM SER QL: 0.09

## 2023-11-08 ENCOUNTER — LAB REQUISITION (OUTPATIENT)
Dept: LAB | Facility: CLINIC | Age: 73
End: 2023-11-08

## 2023-11-08 DIAGNOSIS — E78.2 MIXED HYPERLIPIDEMIA: ICD-10-CM

## 2023-11-08 PROCEDURE — 80061 LIPID PANEL: CPT | Performed by: PHYSICIAN ASSISTANT

## 2023-11-09 LAB
CHOLEST SERPL-MCNC: 185 MG/DL
HDLC SERPL-MCNC: 78 MG/DL
LDLC SERPL CALC-MCNC: 98 MG/DL
NONHDLC SERPL-MCNC: 107 MG/DL
TRIGL SERPL-MCNC: 45 MG/DL

## 2024-04-10 ENCOUNTER — LAB REQUISITION (OUTPATIENT)
Dept: LAB | Facility: CLINIC | Age: 74
End: 2024-04-10

## 2024-04-10 DIAGNOSIS — L65.9 NONSCARRING HAIR LOSS, UNSPECIFIED: ICD-10-CM

## 2024-04-10 LAB
ALBUMIN SERPL BCG-MCNC: 4.4 G/DL (ref 3.5–5.2)
ALP SERPL-CCNC: 77 U/L (ref 40–150)
ALT SERPL W P-5'-P-CCNC: 44 U/L (ref 0–50)
ANION GAP SERPL CALCULATED.3IONS-SCNC: 11 MMOL/L (ref 7–15)
AST SERPL W P-5'-P-CCNC: 29 U/L (ref 0–45)
BILIRUB SERPL-MCNC: 0.6 MG/DL
BUN SERPL-MCNC: 20.9 MG/DL (ref 8–23)
CALCIUM SERPL-MCNC: 9.6 MG/DL (ref 8.8–10.2)
CHLORIDE SERPL-SCNC: 101 MMOL/L (ref 98–107)
CREAT SERPL-MCNC: 0.71 MG/DL (ref 0.51–0.95)
DEPRECATED HCO3 PLAS-SCNC: 27 MMOL/L (ref 22–29)
EGFRCR SERPLBLD CKD-EPI 2021: 88 ML/MIN/1.73M2
ERYTHROCYTE [DISTWIDTH] IN BLOOD BY AUTOMATED COUNT: 12.1 % (ref 10–15)
GLUCOSE SERPL-MCNC: 95 MG/DL (ref 70–99)
HCT VFR BLD AUTO: 42.1 % (ref 35–47)
HGB BLD-MCNC: 14.1 G/DL (ref 11.7–15.7)
MCH RBC QN AUTO: 29.7 PG (ref 26.5–33)
MCHC RBC AUTO-ENTMCNC: 33.5 G/DL (ref 31.5–36.5)
MCV RBC AUTO: 89 FL (ref 78–100)
PLATELET # BLD AUTO: 260 10E3/UL (ref 150–450)
POTASSIUM SERPL-SCNC: 4.3 MMOL/L (ref 3.4–5.3)
PROT SERPL-MCNC: 7 G/DL (ref 6.4–8.3)
RBC # BLD AUTO: 4.75 10E6/UL (ref 3.8–5.2)
SODIUM SERPL-SCNC: 139 MMOL/L (ref 135–145)
TSH SERPL DL<=0.005 MIU/L-ACNC: 2.67 UIU/ML (ref 0.3–4.2)
WBC # BLD AUTO: 11.3 10E3/UL (ref 4–11)

## 2024-04-10 PROCEDURE — 80053 COMPREHEN METABOLIC PANEL: CPT | Performed by: PHYSICIAN ASSISTANT

## 2024-04-10 PROCEDURE — 84443 ASSAY THYROID STIM HORMONE: CPT | Performed by: PHYSICIAN ASSISTANT

## 2024-04-10 PROCEDURE — 85027 COMPLETE CBC AUTOMATED: CPT | Performed by: PHYSICIAN ASSISTANT

## 2024-05-31 ENCOUNTER — LAB REQUISITION (OUTPATIENT)
Dept: LAB | Facility: CLINIC | Age: 74
End: 2024-05-31

## 2024-05-31 DIAGNOSIS — E78.2 MIXED HYPERLIPIDEMIA: ICD-10-CM

## 2024-05-31 PROCEDURE — 82465 ASSAY BLD/SERUM CHOLESTEROL: CPT | Performed by: PHYSICIAN ASSISTANT

## 2024-06-01 LAB
CHOLEST SERPL-MCNC: 230 MG/DL
FASTING STATUS PATIENT QL REPORTED: ABNORMAL
HDLC SERPL-MCNC: 74 MG/DL
LDLC SERPL CALC-MCNC: 145 MG/DL
NONHDLC SERPL-MCNC: 156 MG/DL
TRIGL SERPL-MCNC: 55 MG/DL

## 2024-11-09 ENCOUNTER — HEALTH MAINTENANCE LETTER (OUTPATIENT)
Age: 74
End: 2024-11-09

## 2024-12-02 ENCOUNTER — LAB REQUISITION (OUTPATIENT)
Dept: LAB | Facility: CLINIC | Age: 74
End: 2024-12-02
Payer: COMMERCIAL

## 2024-12-02 DIAGNOSIS — E78.2 MIXED HYPERLIPIDEMIA: ICD-10-CM

## 2024-12-02 DIAGNOSIS — Z86.39 PERSONAL HISTORY OF OTHER ENDOCRINE, NUTRITIONAL AND METABOLIC DISEASE: ICD-10-CM

## 2024-12-02 PROCEDURE — 82465 ASSAY BLD/SERUM CHOLESTEROL: CPT | Performed by: PHYSICIAN ASSISTANT

## 2024-12-02 PROCEDURE — 82310 ASSAY OF CALCIUM: CPT | Performed by: PHYSICIAN ASSISTANT

## 2024-12-02 PROCEDURE — 84460 ALANINE AMINO (ALT) (SGPT): CPT | Performed by: PHYSICIAN ASSISTANT

## 2024-12-02 PROCEDURE — 82947 ASSAY GLUCOSE BLOOD QUANT: CPT | Performed by: PHYSICIAN ASSISTANT

## 2024-12-02 PROCEDURE — 82306 VITAMIN D 25 HYDROXY: CPT | Mod: ORL | Performed by: PHYSICIAN ASSISTANT

## 2024-12-03 LAB
ALBUMIN SERPL BCG-MCNC: 4.4 G/DL (ref 3.5–5.2)
ALP SERPL-CCNC: 76 U/L (ref 40–150)
ALT SERPL W P-5'-P-CCNC: 27 U/L (ref 0–50)
ANION GAP SERPL CALCULATED.3IONS-SCNC: 10 MMOL/L (ref 7–15)
AST SERPL W P-5'-P-CCNC: 28 U/L (ref 0–45)
BILIRUB SERPL-MCNC: 0.6 MG/DL
BUN SERPL-MCNC: 9 MG/DL (ref 8–23)
CALCIUM SERPL-MCNC: 9.5 MG/DL (ref 8.8–10.4)
CHLORIDE SERPL-SCNC: 99 MMOL/L (ref 98–107)
CHOLEST SERPL-MCNC: 248 MG/DL
CREAT SERPL-MCNC: 0.69 MG/DL (ref 0.51–0.95)
EGFRCR SERPLBLD CKD-EPI 2021: 89 ML/MIN/1.73M2
FASTING STATUS PATIENT QL REPORTED: ABNORMAL
FASTING STATUS PATIENT QL REPORTED: NORMAL
GLUCOSE SERPL-MCNC: 90 MG/DL (ref 70–99)
HCO3 SERPL-SCNC: 27 MMOL/L (ref 22–29)
HDLC SERPL-MCNC: 85 MG/DL
LDLC SERPL CALC-MCNC: 153 MG/DL
NONHDLC SERPL-MCNC: 163 MG/DL
POTASSIUM SERPL-SCNC: 4.4 MMOL/L (ref 3.4–5.3)
PROT SERPL-MCNC: 7 G/DL (ref 6.4–8.3)
SODIUM SERPL-SCNC: 136 MMOL/L (ref 135–145)
TRIGL SERPL-MCNC: 51 MG/DL
VIT D+METAB SERPL-MCNC: 81 NG/ML (ref 20–50)

## 2025-01-22 ENCOUNTER — LAB REQUISITION (OUTPATIENT)
Dept: LAB | Facility: CLINIC | Age: 75
End: 2025-01-22
Payer: COMMERCIAL

## 2025-01-22 DIAGNOSIS — R00.2 PALPITATIONS: ICD-10-CM

## 2025-01-22 LAB
ERYTHROCYTE [DISTWIDTH] IN BLOOD BY AUTOMATED COUNT: 12.5 % (ref 10–15)
HCT VFR BLD AUTO: 41.4 % (ref 35–47)
HGB BLD-MCNC: 13.5 G/DL (ref 11.7–15.7)
MCH RBC QN AUTO: 29.5 PG (ref 26.5–33)
MCHC RBC AUTO-ENTMCNC: 32.6 G/DL (ref 31.5–36.5)
MCV RBC AUTO: 91 FL (ref 78–100)
PLATELET # BLD AUTO: 266 10E3/UL (ref 150–450)
RBC # BLD AUTO: 4.57 10E6/UL (ref 3.8–5.2)
WBC # BLD AUTO: 6.6 10E3/UL (ref 4–11)

## 2025-01-22 PROCEDURE — 80048 BASIC METABOLIC PNL TOTAL CA: CPT | Mod: ORL | Performed by: PHYSICIAN ASSISTANT

## 2025-01-22 PROCEDURE — 84443 ASSAY THYROID STIM HORMONE: CPT | Mod: ORL | Performed by: PHYSICIAN ASSISTANT

## 2025-01-22 PROCEDURE — 84439 ASSAY OF FREE THYROXINE: CPT | Mod: ORL | Performed by: PHYSICIAN ASSISTANT

## 2025-01-22 PROCEDURE — 85027 COMPLETE CBC AUTOMATED: CPT | Mod: ORL | Performed by: PHYSICIAN ASSISTANT

## 2025-01-23 LAB
ANION GAP SERPL CALCULATED.3IONS-SCNC: 12 MMOL/L (ref 7–15)
BUN SERPL-MCNC: 15.2 MG/DL (ref 8–23)
CALCIUM SERPL-MCNC: 9.2 MG/DL (ref 8.8–10.4)
CHLORIDE SERPL-SCNC: 102 MMOL/L (ref 98–107)
CREAT SERPL-MCNC: 0.72 MG/DL (ref 0.51–0.95)
EGFRCR SERPLBLD CKD-EPI 2021: 86 ML/MIN/1.73M2
GLUCOSE SERPL-MCNC: 105 MG/DL (ref 70–99)
HCO3 SERPL-SCNC: 24 MMOL/L (ref 22–29)
POTASSIUM SERPL-SCNC: 4 MMOL/L (ref 3.4–5.3)
SODIUM SERPL-SCNC: 138 MMOL/L (ref 135–145)
T4 FREE SERPL-MCNC: 1.32 NG/DL (ref 0.9–1.7)
TSH SERPL DL<=0.005 MIU/L-ACNC: 2.1 UIU/ML (ref 0.3–4.2)

## 2025-05-24 ENCOUNTER — HOSPITAL ENCOUNTER (OUTPATIENT)
Dept: CT IMAGING | Facility: CLINIC | Age: 75
Discharge: HOME | End: 2025-05-24
Attending: PHYSICIAN ASSISTANT
Payer: COMMERCIAL

## 2025-05-24 DIAGNOSIS — E78.5 HYPERLIPIDEMIA: ICD-10-CM

## 2025-05-24 DIAGNOSIS — Z87.891 HISTORY OF TOBACCO ABUSE: ICD-10-CM

## 2025-05-24 PROCEDURE — 75571 CT HRT W/O DYE W/CA TEST: CPT

## 2025-05-24 PROCEDURE — 71271 CT THORAX LUNG CANCER SCR C-: CPT

## 2025-05-27 LAB
CV CALCIUM SCORE AGATSTON LM: 0
CV CALCIUM SCORING AGATSON LAD: 31
CV CALCIUM SCORING AGATSTON CX: 9
CV CALCIUM SCORING AGATSTON RCA: 134
CV CALCIUM SCORING AGATSTON TOTAL: 174

## 2025-05-27 PROCEDURE — 75571 CT HRT W/O DYE W/CA TEST: CPT | Mod: 26 | Performed by: INTERNAL MEDICINE
